# Patient Record
Sex: MALE | Race: BLACK OR AFRICAN AMERICAN | Employment: FULL TIME | URBAN - METROPOLITAN AREA
[De-identification: names, ages, dates, MRNs, and addresses within clinical notes are randomized per-mention and may not be internally consistent; named-entity substitution may affect disease eponyms.]

---

## 2022-01-20 ENCOUNTER — HOSPITAL ENCOUNTER (EMERGENCY)
Age: 42
Discharge: HOME | End: 2022-01-20
Attending: EMERGENCY MEDICINE
Payer: COMMERCIAL

## 2022-01-20 VITALS
DIASTOLIC BLOOD PRESSURE: 86 MMHG | WEIGHT: 185 LBS | RESPIRATION RATE: 16 BRPM | TEMPERATURE: 99 F | HEART RATE: 84 BPM | OXYGEN SATURATION: 98 % | HEIGHT: 65 IN | SYSTOLIC BLOOD PRESSURE: 149 MMHG | BODY MASS INDEX: 30.82 KG/M2

## 2022-01-20 DIAGNOSIS — Z20.2 POSSIBLE EXPOSURE TO STD: Primary | ICD-10-CM

## 2022-01-20 LAB
APPEARANCE UR: CLEAR
BACTERIA URNS QL MICRO: NEGATIVE /HPF
BILIRUB UR QL: NEGATIVE
COLOR UR: NORMAL
EPITH CASTS URNS QL MICRO: NORMAL /LPF
GLUCOSE UR STRIP.AUTO-MCNC: NEGATIVE MG/DL
HGB UR QL STRIP: NEGATIVE
KETONES UR QL STRIP.AUTO: NEGATIVE MG/DL
LEUKOCYTE ESTERASE UR QL STRIP.AUTO: NEGATIVE
NITRITE UR QL STRIP.AUTO: NEGATIVE
PH UR STRIP: 5.5 [PH] (ref 5–8)
PROT UR STRIP-MCNC: NEGATIVE MG/DL
RBC #/AREA URNS HPF: NORMAL /HPF (ref 0–5)
SP GR UR REFRACTOMETRY: 1.01 (ref 1–1.03)
UA: UC IF INDICATED,UAUC: NORMAL
UROBILINOGEN UR QL STRIP.AUTO: 0.2 EU/DL (ref 0.2–1)
WBC URNS QL MICRO: NORMAL /HPF (ref 0–4)

## 2022-01-20 PROCEDURE — 99283 EMERGENCY DEPT VISIT LOW MDM: CPT

## 2022-01-20 PROCEDURE — 87491 CHLMYD TRACH DNA AMP PROBE: CPT

## 2022-01-20 PROCEDURE — 81001 URINALYSIS AUTO W/SCOPE: CPT

## 2022-01-20 NOTE — ED PROVIDER NOTES
Pt.wants to be tested for STD,has no symptoms,just want to get checked. Exposure to STD  Pertinent negatives include no nausea, no vomiting and no abdominal pain. No past medical history on file. No past surgical history on file. No family history on file. Social History     Socioeconomic History    Marital status: Not on file     Spouse name: Not on file    Number of children: Not on file    Years of education: Not on file    Highest education level: Not on file   Occupational History    Not on file   Tobacco Use    Smoking status: Not on file    Smokeless tobacco: Not on file   Substance and Sexual Activity    Alcohol use: Not on file    Drug use: Not on file    Sexual activity: Not on file   Other Topics Concern    Not on file   Social History Narrative    Not on file     Social Determinants of Health     Financial Resource Strain:     Difficulty of Paying Living Expenses: Not on file   Food Insecurity:     Worried About Running Out of Food in the Last Year: Not on file    Steve of Food in the Last Year: Not on file   Transportation Needs:     Lack of Transportation (Medical): Not on file    Lack of Transportation (Non-Medical):  Not on file   Physical Activity:     Days of Exercise per Week: Not on file    Minutes of Exercise per Session: Not on file   Stress:     Feeling of Stress : Not on file   Social Connections:     Frequency of Communication with Friends and Family: Not on file    Frequency of Social Gatherings with Friends and Family: Not on file    Attends Zoroastrianism Services: Not on file    Active Member of Clubs or Organizations: Not on file    Attends Club or Organization Meetings: Not on file    Marital Status: Not on file   Intimate Partner Violence:     Fear of Current or Ex-Partner: Not on file    Emotionally Abused: Not on file    Physically Abused: Not on file    Sexually Abused: Not on file   Housing Stability:     Unable to Pay for Housing in the Last Year: Not on file    Number of Places Lived in the Last Year: Not on file    Unstable Housing in the Last Year: Not on file         ALLERGIES: Patient has no known allergies. Review of Systems   Constitutional: Negative for fever. HENT: Negative for sore throat and trouble swallowing. Eyes: Negative for photophobia and redness. Respiratory: Negative for cough and shortness of breath. Cardiovascular: Negative for chest pain and leg swelling. Gastrointestinal: Negative for abdominal pain, constipation, diarrhea, nausea and vomiting. Endocrine: Negative for polydipsia and polyuria. Genitourinary: Negative for dysuria, hematuria, penile discharge, penile swelling and scrotal swelling. Musculoskeletal: Negative for back pain and joint swelling. Skin: Negative for rash. Neurological: Negative for dizziness, syncope, weakness and headaches. Psychiatric/Behavioral: Negative for suicidal ideas. All other systems reviewed and are negative. Vitals:    01/20/22 0707   BP: (!) 149/86   Pulse: 84   Resp: 16   Temp: 99 °F (37.2 °C)   SpO2: 98%   Weight: 83.9 kg (185 lb)   Height: 5' 5\" (1.651 m)            Physical Exam  Vitals and nursing note reviewed. Constitutional:       General: He is not in acute distress. Appearance: He is well-developed. HENT:      Head: Normocephalic and atraumatic. Mouth/Throat:      Pharynx: No oropharyngeal exudate. Eyes:      General:         Left eye: No discharge. Conjunctiva/sclera: Conjunctivae normal.      Pupils: Pupils are equal, round, and reactive to light. Neck:      Vascular: No JVD. Cardiovascular:      Rate and Rhythm: Normal rate and regular rhythm. Heart sounds: Normal heart sounds. Pulmonary:      Effort: Pulmonary effort is normal. No respiratory distress. Breath sounds: Normal breath sounds. No wheezing. Abdominal:      General: Bowel sounds are normal. There is no distension.       Palpations: Abdomen is soft. Tenderness: There is no abdominal tenderness. There is no guarding or rebound. Musculoskeletal:         General: No tenderness. Normal range of motion. Cervical back: Normal range of motion and neck supple. Lymphadenopathy:      Cervical: No cervical adenopathy. Skin:     General: Skin is warm and dry. Findings: No rash. Neurological:      Mental Status: He is alert and oriented to person, place, and time. Cranial Nerves: No cranial nerve deficit. Deep Tendon Reflexes: Reflexes are normal and symmetric.    Psychiatric:         Behavior: Behavior normal.          MDM  Number of Diagnoses or Management Options  Diagnosis management comments: Std/uti         Procedures    Progress Note:  7:38 AM I offered pt to get the prophylaxis for std but he said he wants to wait for tests results and if there are positive he will comeback to get treated

## 2022-01-20 NOTE — ED NOTES
Pt in for STD testing. Denies known exposure, denies symptoms, states \"I'm just being careful and want to make sure I don't take anything home. \"      Emergency Department Nursing Plan of Care       The Nursing Plan of Care is developed from the Nursing assessment and Emergency Department Attending provider initial evaluation. The plan of care may be reviewed in the ED Provider note.     The Plan of Care was developed with the following considerations:   Patient / Family readiness to learn indicated by:verbalized understanding  Persons(s) to be included in education: patient  Barriers to Learning/Limitations:No    Signed     Frances Gautam RN    1/20/2022   7:18 AM

## 2022-01-25 LAB
C TRACH RRNA SPEC QL NAA+PROBE: NEGATIVE
N GONORRHOEA RRNA SPEC QL NAA+PROBE: NEGATIVE
SPECIMEN SOURCE: NORMAL